# Patient Record
Sex: MALE | Race: WHITE | HISPANIC OR LATINO | URBAN - METROPOLITAN AREA
[De-identification: names, ages, dates, MRNs, and addresses within clinical notes are randomized per-mention and may not be internally consistent; named-entity substitution may affect disease eponyms.]

---

## 2019-06-14 ENCOUNTER — HOSPITAL ENCOUNTER (EMERGENCY)
Facility: HOSPITAL | Age: 19
Discharge: HOME | End: 2019-06-14
Attending: EMERGENCY MEDICINE

## 2019-06-14 VITALS
DIASTOLIC BLOOD PRESSURE: 70 MMHG | RESPIRATION RATE: 16 BRPM | OXYGEN SATURATION: 99 % | WEIGHT: 233 LBS | HEART RATE: 59 BPM | BODY MASS INDEX: 34.51 KG/M2 | SYSTOLIC BLOOD PRESSURE: 118 MMHG | HEIGHT: 69 IN | TEMPERATURE: 99 F

## 2019-06-14 DIAGNOSIS — L29.9 ITCHING: ICD-10-CM

## 2019-06-14 DIAGNOSIS — R21 RASH: Primary | ICD-10-CM

## 2019-06-14 LAB — DEPRECATED S PYO AG THROAT QL EIA: NEGATIVE

## 2019-06-14 PROCEDURE — 87880 STREP A ASSAY W/OPTIC: CPT

## 2019-06-14 PROCEDURE — 25000003 PHARM REV CODE 250: Performed by: PHYSICIAN ASSISTANT

## 2019-06-14 PROCEDURE — 63600175 PHARM REV CODE 636 W HCPCS: Performed by: PHYSICIAN ASSISTANT

## 2019-06-14 PROCEDURE — 87081 CULTURE SCREEN ONLY: CPT

## 2019-06-14 PROCEDURE — 99283 EMERGENCY DEPT VISIT LOW MDM: CPT | Mod: 25

## 2019-06-14 RX ORDER — FAMOTIDINE 20 MG/1
40 TABLET, FILM COATED ORAL
Status: COMPLETED | OUTPATIENT
Start: 2019-06-14 | End: 2019-06-14

## 2019-06-14 RX ORDER — DIPHENHYDRAMINE HCL 25 MG
25 CAPSULE ORAL EVERY 6 HOURS PRN
Qty: 20 CAPSULE | Refills: 0 | COMMUNITY
Start: 2019-06-14 | End: 2019-06-14 | Stop reason: SDUPTHER

## 2019-06-14 RX ORDER — PREDNISONE 20 MG/1
60 TABLET ORAL
Status: COMPLETED | OUTPATIENT
Start: 2019-06-14 | End: 2019-06-14

## 2019-06-14 RX ORDER — PREDNISONE 20 MG/1
60 TABLET ORAL DAILY
Qty: 15 TABLET | Refills: 0 | Status: SHIPPED | OUTPATIENT
Start: 2019-06-14 | End: 2019-06-19

## 2019-06-14 RX ORDER — DIPHENHYDRAMINE HCL 25 MG
25 CAPSULE ORAL EVERY 6 HOURS PRN
Qty: 20 CAPSULE | Refills: 0 | Status: SHIPPED | OUTPATIENT
Start: 2019-06-14

## 2019-06-14 RX ORDER — DIPHENHYDRAMINE HCL 25 MG
25 CAPSULE ORAL
Status: COMPLETED | OUTPATIENT
Start: 2019-06-14 | End: 2019-06-14

## 2019-06-14 RX ORDER — FAMOTIDINE 20 MG/1
40 TABLET, FILM COATED ORAL 2 TIMES DAILY
Qty: 20 TABLET | Refills: 0 | Status: SHIPPED | OUTPATIENT
Start: 2019-06-14 | End: 2019-06-19

## 2019-06-14 RX ADMIN — FAMOTIDINE 40 MG: 20 TABLET ORAL at 01:06

## 2019-06-14 RX ADMIN — DIPHENHYDRAMINE HYDROCHLORIDE 25 MG: 25 CAPSULE ORAL at 01:06

## 2019-06-14 RX ADMIN — PREDNISONE 60 MG: 20 TABLET ORAL at 01:06

## 2019-06-14 NOTE — ED NOTES
Patient reports that he had fever last night and has irritation to his throat. No respiratory distress noted.

## 2019-06-14 NOTE — ED PROVIDER NOTES
Encounter Date: 6/14/2019       History     Chief Complaint   Patient presents with    Rash     Pt presents with rash to upper extremities with itch since yesterday. Pt in NAD.      A 18-year-old male with no past medical history presents to the emergency department for itchy rash that began last night.  Denies pain. Notes associated mild cough with sore throat.  Denies fever and nasal congestion.  Denies shortness of breath and throat swelling.  Tolerating p.o..  Took an unknown medication of an a.m. today.  No history of similar symptoms. 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  Denies new food products, hygiene products, or cleaning supplies.  No new pets or foods.  No known allergies.  Behaving normally per on-call accompanies patient.        Review of patient's allergies indicates:  No Known Allergies  History reviewed. No pertinent past medical history.  History reviewed. No pertinent surgical history.  History reviewed. No pertinent family history.  Social History     Tobacco Use    Smoking status: Never Smoker   Substance Use Topics    Alcohol use: Not Currently    Drug use: Never     Review of Systems   Constitutional: Negative for fever.   HENT: Positive for sore throat. Negative for congestion and trouble swallowing.    Respiratory: Positive for cough. Negative for shortness of breath.    Cardiovascular: Negative for chest pain.   Gastrointestinal: Negative for abdominal pain, constipation, diarrhea, nausea and vomiting.   Genitourinary: Negative for dysuria, flank pain, frequency and urgency.   Musculoskeletal: Negative for back pain.   Skin: Positive for rash.   Neurological: Negative for headaches.   All other systems reviewed and are negative.      Physical Exam     Initial Vitals [06/14/19 1313]   BP Pulse Resp Temp SpO2   139/86 80 16 98 °F (36.7 °C) 98 %      MAP       --         Physical Exam    Nursing note and vitals  reviewed.  Constitutional: He appears well-developed and well-nourished. He is not diaphoretic. No distress.   HENT:   Head: Normocephalic and atraumatic.   Nose: Nose normal.   Mouth/Throat: Oropharynx is clear and moist. No oropharyngeal exudate.   Speaking in clear and complete sentences with no changes in phonation.  No drooling or trismus.  No uvular swelling or deviation.   Eyes: Conjunctivae and EOM are normal. Pupils are equal, round, and reactive to light. Right eye exhibits no discharge. Left eye exhibits no discharge.   Neck: Normal range of motion. No tracheal deviation present. No JVD present.   Cardiovascular: Normal rate, regular rhythm and normal heart sounds. Exam reveals no friction rub.    No murmur heard.  Pulmonary/Chest: Breath sounds normal. No accessory muscle usage or stridor. No tachypnea. No respiratory distress. He has no decreased breath sounds. He has no wheezes. He has no rhonchi. He has no rales. He exhibits no tenderness.   Musculoskeletal: Normal range of motion.   Neurological: He is alert and oriented to person, place, and time.   Skin: Skin is warm and dry. No abscess noted. No erythema. No pallor.   Hives to bilateral upper extremities; more prominent to the right.  There is also hives to the upper back/neck.  Nontender. No erythema, drainage, petechiae, bulla, or skin sloughing.         ED Course   Procedures  Labs Reviewed   THROAT SCREEN, RAPID   CULTURE, STREP A,  THROAT          Imaging Results    None          Medical Decision Making:   History:   Old Medical Records: I decided to obtain old medical records.      This is an emergent evaluation of a 18 y.o. male presenting to the ED for a rash. Afebrile. Patient is non-toxic appearing and in no acute distress. Most consistent with allergic process. No respiratory component or evidence of oropharyngeal swelling/edema to suggest anaphylaxis or angioedema. No headache, neck rigidity, or fever to suggest meningitis. No recent  medication use or infections to suggest drug eruption or SJS. No systemic symptoms to suggest viral xanthem. Patient does not report exposure to new hygiene or cleaning products, foods, pets, plants, or medications to suggest an etiology of the rash. At this time, I am unsure of the source of the rash but do not believe it is of emergent etiology.     Discharged home with supportive care. Instructed to follow up with PCP and dermatology for reevaluation and management of symptoms.    I discussed with the patient the diagnosis, treatment plan, indications for return to the emergency department, and for expected follow-up. The patient verbalized an understanding. The patient is asked if there are any questions or concerns. We discuss the case, until all issues are addressed to the patients satisfaction. Patient understands and is agreeable to the plan.                     Clinical Impression:       ICD-10-CM ICD-9-CM   1. Rash R21 782.1   2. Itching L29.9 698.9                                Steven Arteaga PA-C  06/14/19 [id number]

## 2019-06-14 NOTE — ED TRIAGE NOTES
Patient presents to the ED via personal transportation with uncle. Patient reports that he noticed a rash all over his body when he got home from work yesterday. Patient denies handling new materials at work. Denies using new soaps, lotions, or detergent, eating new foods.

## 2019-06-16 LAB — BACTERIA THROAT CULT: NORMAL
